# Patient Record
Sex: MALE | Race: WHITE | NOT HISPANIC OR LATINO | URBAN - METROPOLITAN AREA
[De-identification: names, ages, dates, MRNs, and addresses within clinical notes are randomized per-mention and may not be internally consistent; named-entity substitution may affect disease eponyms.]

---

## 2024-09-12 ENCOUNTER — OFFICE VISIT (OUTPATIENT)
Dept: OBGYN CLINIC | Facility: CLINIC | Age: 24
End: 2024-09-12

## 2024-09-12 VITALS
HEART RATE: 56 BPM | DIASTOLIC BLOOD PRESSURE: 75 MMHG | SYSTOLIC BLOOD PRESSURE: 112 MMHG | WEIGHT: 180 LBS | BODY MASS INDEX: 23.86 KG/M2 | HEIGHT: 73 IN

## 2024-09-12 DIAGNOSIS — S82.891A AVULSION FRACTURE OF ANKLE, RIGHT, CLOSED, INITIAL ENCOUNTER: Primary | ICD-10-CM

## 2024-09-12 PROCEDURE — 99203 OFFICE O/P NEW LOW 30 MIN: CPT | Performed by: STUDENT IN AN ORGANIZED HEALTH CARE EDUCATION/TRAINING PROGRAM

## 2024-09-12 NOTE — PROGRESS NOTES
Date: 24  Chun Allen   MRN# [id number]  : 2000      Chief Complaint: Right Ankle Pain    Assessment and Plan:  The patient verbalized understanding of exam findings and treatment plan. We engaged in the shared decision-making process and treatment options were discussed at length with the patient. Surgical and conservative management discussed today along with risks and benefits. Patient was agreeable with the plan and all questions were answered to satisfaction.     Avulsion fracture of ankle, right, closed, initial encounter  Patient is 3 weeks s/p avulsion fracture of right distal fibula. Inversion injury sustained while playing basketball  - WBAT RLE. Wean boot protocol intiated   - no barefoot/sandal/slipper walking   - NSAIDs as needed  - Tylenol 1000mg up to three times daily as needed. Do not exceed 3000mg daily  - Supervised physical therapy. Script provided   - Home exercise program directed by PT  -Patient may follow up in 3 week(s) for further evaluation and treatment        Subjective:     Ankle Pain  Patient presents to the clinic today, self referred, with complaints of right ankle pain. Patient states he was seen at urgent care yesterday who provided him with a Cam boot and directed him to be WBAT. This is evaluated as a personal injury. The pain began 3 weeks ago. He suffered an inversion injury while playing basketball. The pain is located lateral ankle, ATFL and rates their pain as 7/10. He describes the symptoms as aching and sharp. Symptoms improve with rest. The symptoms are worse with activity. The ankle has not given out or felt unstable. Treatment to date has been cam boot, with moderate relief.     External Records Reviewed: none    Prior treatment:  NSAIDs Yes    Bracing Yes   Physical Therapy No   Cortisone Injections No     Allergy:  No Known Allergies  Medications:  All current active meds have been reviewed   Past Medical History:  History reviewed. No  "pertinent past medical history.  Past Surgical History:  History reviewed. No pertinent surgical history.  Family History:  History reviewed. No pertinent family history.  Social History:  Social History     Substance and Sexual Activity   Alcohol Use None     Social History     Substance and Sexual Activity   Drug Use Not on file     Social History     Tobacco Use   Smoking Status Never    Passive exposure: Never   Smokeless Tobacco Never           Review of Systems:  General- denies fever/chills  HEENT- denies hearing loss or sore throat  Eyes- denies eye pain or visual disturbances, denies red eyes  Respiratory- denies cough or SOB  Cardio- denies chest pain or palpitations  GI- denies abdominal pain  Endocrine- denies urinary frequency  Urinary- denies pain with urination  Musculoskeletal- Negative except noted above  Skin- denies rashes or wounds  Neurological- denies dizziness or headache  Psychiatric- denies anxiety or difficulty concentrating      Objective:   BP Readings from Last 1 Encounters:   09/12/24 112/75      Wt Readings from Last 1 Encounters:   09/12/24 81.6 kg (180 lb)      Pulse Readings from Last 1 Encounters:   09/12/24 56        BMI: Estimated body mass index is 23.75 kg/m² as calculated from the following:    Height as of this encounter: 6' 1\" (1.854 m).    Weight as of this encounter: 81.6 kg (180 lb).      Physical Exam  /75   Pulse 56   Ht 6' 1\" (1.854 m)   Wt 81.6 kg (180 lb)   BMI 23.75 kg/m²   General/Constitutional: No apparent distress: well-nourished and well developed.  Eyes: normal ocular motion  Cardio: RRR, Normal S1S2, No m/r/g.   Lymphatic: No appreciable lymphadenopathy  Respiratory: Non-labored breathing, CTA b/l no w/c/r  Vascular: No edema, swelling or tenderness, except as noted in detailed exam. Extremities well perfused. No LE edema  Integumentary: No impressive skin lesions present, except as noted in detailed exam.  Neuro: No ataxia or tremors noted  Psych: " Normal mood and affect, oriented to person, place and time. Appropriate affect.  Musculoskeletal: Normal, except as noted in detailed exam and in HPI.    Gait and Station:   normal    Right Foot & Ankle Exam  Alignment:  Normal ankle alignment.  Inspection:   mild swelling. No deformity.  Palpation:   NO tenderness. Over ATFL, deltoid, lateral malleolus  ROM:  Normal ankle ROM.  Strength:  5/5 AT, GSC, PT, and peroneals.  Stability:  (-) Anterior  neutral. (-) Anterior  PF.  Tests:  No pertinent positive or negative tests.  Neurovascular:  Sensation intact L1-S1 RLE. 2+ DP & PT pulses.  Gait:  Normal.      Images:  I personally reviewed relevant images in the PACS system and my interpretation is as follows:    X-rays of the right ankle: avulsion fx of right lateral malleolus. NO other fractures or acute osseous injuries noted.          Scribe Attestation      I,:  Mateo Willams PA-C am acting as a scribe while in the presence of the attending physician.:       I,:  Fabian Smith MD personally performed the services described in this documentation    as scribed in my presence.:               Fabian Smith MD  Adult Reconstruction Specialist   [facility]

## 2024-09-12 NOTE — ASSESSMENT & PLAN NOTE
Patient is 3 weeks s/p avulsion fracture of right distal fibula. Inversion injury sustained while playing basketball  - WBAT RLE. Wean boot protocol intiated   - no barefoot/sandal/slipper walking   - NSAIDs as needed  - Tylenol 1000mg up to three times daily as needed. Do not exceed 3000mg daily  - Supervised physical therapy. Script provided   - Home exercise program directed by PT  -Patient may follow up in 3 week(s) for further evaluation and treatment

## 2024-09-12 NOTE — PATIENT INSTRUCTIONS
You may now begin weaning your boot and transitioning to a sneaker. It is important to do this gradually to avoid aggravating the healing process.    Today, you may come out of the boot into a sneaker for 2 hours.  2. Tomorrow, you may come out of the boot into a sneaker for 4 hours,  3. The next day, you may come out of the boot into a sneaker for 6 hours.  4. Continue this (adding 2 hours per day) as you tolerate. For example, if you do 6 hours out of the boot into a sneaker and your foot swells more than usual at night and it is difficult to control the discomfort, do not advance to 8 hours the next day, stay at 6 hours until you are able to tolerate it.    Elevation, Ice and tylenol and staying off of it at night will be important to aide in this transition out of the boot. Swelling and soreness are normal as you begin to do more with the injured leg.

## 2024-09-13 ENCOUNTER — TELEPHONE (OUTPATIENT)
Age: 24
End: 2024-09-13

## 2024-09-13 NOTE — TELEPHONE ENCOUNTER
Caller: Latosha - mom (on communication form)    Doctor: Luis    Reason for call: Patients mom is calling to request someone from the clinical team to call her to go over Hector's appt from yesterday since she was not able to attend with him. She stated she was unclear of his instructions and mom would like to discuss    Call back#: [phone]

## 2024-09-16 ENCOUNTER — TELEPHONE (OUTPATIENT)
Dept: OBGYN CLINIC | Facility: HOSPITAL | Age: 24
End: 2024-09-16

## 2024-09-16 NOTE — TELEPHONE ENCOUNTER
Caller: Sherri ([facility])    Doctor/Office: Luis/[place]    CB#: [phone]      What needs to be faxed: PT script    ATTN to: Sherri    Fax#: [phone]

## 2024-09-18 NOTE — TELEPHONE ENCOUNTER
Caller: Miguel Alfaro    Doctor: Luis    Reason for call: Please refax PT script to [phone]. Patient has appt 9/19    Call back#: [phone]

## 2024-10-04 ENCOUNTER — OFFICE VISIT (OUTPATIENT)
Dept: OBGYN CLINIC | Facility: CLINIC | Age: 24
End: 2024-10-04

## 2024-10-04 VITALS — WEIGHT: 180 LBS | BODY MASS INDEX: 23.86 KG/M2 | HEIGHT: 73 IN

## 2024-10-04 DIAGNOSIS — S82.891A AVULSION FRACTURE OF ANKLE, RIGHT, CLOSED, INITIAL ENCOUNTER: Primary | ICD-10-CM

## 2024-10-04 PROCEDURE — 99213 OFFICE O/P EST LOW 20 MIN: CPT | Performed by: STUDENT IN AN ORGANIZED HEALTH CARE EDUCATION/TRAINING PROGRAM

## 2024-10-04 NOTE — ASSESSMENT & PLAN NOTE
Patient is 6 weeks s/p avulsion fracture of right distal fibula. Inversion injury sustained while playing basketball  - WBAT RLE. No restrictions  - NSAIDs as needed  - Tylenol 1000mg up to three times daily as needed. Do not exceed 3000mg daily  - Continue PT/HEP as desired  -Patient may follow up prn

## 2024-10-04 NOTE — PROGRESS NOTES
Date: 10/04/24  Chun Allen   MRN# [id number]  : 2000      Chief Complaint: Right Ankle Pain    Assessment and Plan:  The patient verbalized understanding of exam findings and treatment plan. We engaged in the shared decision-making process and treatment options were discussed at length with the patient. Surgical and conservative management discussed today along with risks and benefits. Patient was agreeable with the plan and all questions were answered to satisfaction.     Avulsion fracture of ankle, right, closed, initial encounter  Patient is 6 weeks s/p avulsion fracture of right distal fibula. Inversion injury sustained while playing basketball  - WBAT RLE. No restrictions  - NSAIDs as needed  - Tylenol 1000mg up to three times daily as needed. Do not exceed 3000mg daily  - Continue PT/HEP as desired  -Patient may follow up prn       Subjective:     Ankle Pain  Patient presents to the clinic today in follow up for right ankle fx. Patient states he is doing well and has weaned out of the boot without issue. He has been complaint with physical therapy. Last time we saw him, we recommended WBAT, wean cam boot and into sneaker. No pain issues. No new concerns or complaints.       Allergy:  No Known Allergies  Medications:  All current active meds have been reviewed   Past Medical History:  History reviewed. No pertinent past medical history.  Past Surgical History:  History reviewed. No pertinent surgical history.  Family History:  History reviewed. No pertinent family history.  Social History:  Social History     Substance and Sexual Activity   Alcohol Use None     Social History     Substance and Sexual Activity   Drug Use Not on file     Social History     Tobacco Use   Smoking Status Never    Passive exposure: Never   Smokeless Tobacco Never           Review of Systems:  General- denies fever/chills  HEENT- denies hearing loss or sore throat  Eyes- denies eye pain or visual disturbances,  "denies red eyes  Respiratory- denies cough or SOB  Cardio- denies chest pain or palpitations  GI- denies abdominal pain  Endocrine- denies urinary frequency  Urinary- denies pain with urination  Musculoskeletal- Negative except noted above  Skin- denies rashes or wounds  Neurological- denies dizziness or headache  Psychiatric- denies anxiety or difficulty concentrating      Objective:   BP Readings from Last 1 Encounters:   09/12/24 112/75      Wt Readings from Last 1 Encounters:   10/04/24 81.6 kg (180 lb)      Pulse Readings from Last 1 Encounters:   09/12/24 56        BMI: Estimated body mass index is 23.75 kg/m² as calculated from the following:    Height as of this encounter: 6' 1\" (1.854 m).    Weight as of this encounter: 81.6 kg (180 lb).      Physical Exam  Ht 6' 1\" (1.854 m)   Wt 81.6 kg (180 lb)   BMI 23.75 kg/m²   General/Constitutional: No apparent distress: well-nourished and well developed.  Eyes: normal ocular motion  Cardio: RRR, Normal S1S2, No m/r/g.   Lymphatic: No appreciable lymphadenopathy  Respiratory: Non-labored breathing, CTA b/l no w/c/r  Vascular: No edema, swelling or tenderness, except as noted in detailed exam. Extremities well perfused. No LE edema  Integumentary: No impressive skin lesions present, except as noted in detailed exam.  Neuro: No ataxia or tremors noted  Psych: Normal mood and affect, oriented to person, place and time. Appropriate affect.  Musculoskeletal: Normal, except as noted in detailed exam and in HPI.    Gait and Station:   normal    Right Foot & Ankle Exam  Alignment:  Normal ankle alignment.  Inspection:   mild swelling. No deformity.  Palpation:   NO tenderness. Over ATFL, deltoid, lateral malleolus  ROM:  Normal ankle ROM.  Strength:  5/5 AT, GSC, PT, and peroneals.  Stability:  (-) Anterior  neutral. (-) Anterior  PF.  Tests:  No pertinent positive or negative tests.  Neurovascular:  Sensation intact L1-S1 RLE. 2+ DP & PT pulses.  Gait:  " Normal.      Images:  No new imaging        Scribe Attestation      I,:  Mateo Willams PA-C am acting as a scribe while in the presence of the attending physician.:       I,:  Fabian Smith MD personally performed the services described in this documentation    as scribed in my presence.:               Fabian Smith MD  Adult Reconstruction Specialist   [facility]